# Patient Record
Sex: MALE | Race: WHITE
[De-identification: names, ages, dates, MRNs, and addresses within clinical notes are randomized per-mention and may not be internally consistent; named-entity substitution may affect disease eponyms.]

---

## 2019-01-31 ENCOUNTER — HOSPITAL ENCOUNTER (EMERGENCY)
Dept: HOSPITAL 91 - FTE | Age: 49
Discharge: HOME | End: 2019-01-31
Payer: MEDICAID

## 2019-01-31 ENCOUNTER — HOSPITAL ENCOUNTER (EMERGENCY)
Dept: HOSPITAL 10 - FTE | Age: 49
Discharge: HOME | End: 2019-01-31
Payer: MEDICAID

## 2019-01-31 VITALS — RESPIRATION RATE: 18 BRPM | SYSTOLIC BLOOD PRESSURE: 158 MMHG | HEART RATE: 91 BPM | DIASTOLIC BLOOD PRESSURE: 90 MMHG

## 2019-01-31 VITALS — HEIGHT: 60 IN | WEIGHT: 226.64 LBS | BODY MASS INDEX: 44.49 KG/M2

## 2019-01-31 DIAGNOSIS — S92.355A: Primary | ICD-10-CM
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
## 2019-01-31 PROCEDURE — 73630 X-RAY EXAM OF FOOT: CPT

## 2019-01-31 PROCEDURE — 29515 APPLICATION SHORT LEG SPLINT: CPT

## 2019-01-31 PROCEDURE — 99283 EMERGENCY DEPT VISIT LOW MDM: CPT

## 2019-01-31 PROCEDURE — 73610 X-RAY EXAM OF ANKLE: CPT

## 2019-01-31 NOTE — ERD
ER Documentation


Chief Complaint


Chief Complaint





LEFT FOOT PAIN





HPI


48-year-old male presenting with pain to left foot.  3 days ago patient was 


walking in his ankle.  He had pain to his foot ever since.  He denies any 


numbness and tingling and he has not taken medications for pain.  He denies 


other medical problems.  NKDA.  Surgical history denies.  Social history denies





ROS


All systems reviewed and are negative except as per history of present illness.





Medications


Home Meds


Active Scripts


Naproxen* (Naprosyn*) 500 Mg Tablet, 500 MG PO BID PRN for PAIN AND/OR 


INFLAMMATION, #30 TAB


   Prov:JOSE MARIA ALVARADO PA-C         19


Hydrocodone/Acetaminophen (Norco 5-325 Tablet) 1 Each Tablet, 1 TAB PO Q6H PRN 


for PAIN, #7 TAB


   Prov:JOSE MARIA ALVARADO PA-C         19





Allergies


Allergies:  


Coded Allergies:  


     No Known Allergy (Unverified , 19)





PMhx/Soc


Medical and Surgical Hx:  pt denies Medical Hx, pt denies Surgical Hx


Hx Alcohol Use:  No


Hx Substance Use:  No


Hx Tobacco Use:  No


Smoking Status:  Never smoker





FmHx


Family History:  No diabetes, No coronary disease, No other





Physical Exam


Vitals





Vital Signs


  Date      Temp  Pulse  Resp  B/P (MAP)   Pulse Ox  O2          O2 Flow    FiO2


Time                                                 Delivery    Rate


   19  98.7     91    18      158/90        99


     11:34                          (112)





Physical Exam


GENERAL: The patient is well-appearing, well-nourished, in no acute distress


CHEST: Clear to auscultation bilaterally.  There are no rales, wheezes or 


rhonchi.


HEART: Regular rate and rhythm.  No murmurs, clicks, rubs or gallops.  No S3 or 


S4.


EXTREMITIES: Tender to palpation over the base the fifth metatarsals.  Mild 


tenderness to palpation over the lateral left malleolus.  No obvious deformity. 


Normal range of motion in flexion and extension.  Strength 5 out of 5.


NEUROLOGIC:  Motor strength in all 4 extremities with 5 out of 5 strength.  


Sensation grossly intact.  


SKIN: There is no apparent rash or petechiae.  The skin is warm and dry.





Procedures/MDM


                            DIAGNOSTIC IMAGING REPORT





Patient: AINSLEY PACE   : 1970   Age: 48  Sex: M                  


     


       MR #:    [id number]   Acct #:   [id number]    DOS: 19 1221


Ordering MD: FLYNN ALVARADO PA-C   Location:  [facility]   Room/Bed:            


                               


                                        


PROCEDURE: XR Left Ankle


 


CLINICAL INDICATION:  Pain


 


TECHNIQUE:   Standard 3 view radiographs were submitted.


 


COMPARISON:   None


 


FINDINGS:


 


Osseous structures: There is a nondisplaced transverse fracture through the base


of the left fifth metatarsal. No other fractures identified. Calcaneal spurring 


is seen at the insertion of the Achilles tendon and plantar aponeurosis.


 


Joint spaces:  Well maintained with no significant erosions or spurring evident.


 


Soft tissues: There is soft tissue swelling about the lateral malleolus 


suspicious for a sprain.


 


IMPRESSION: 


1.  Nondisplaced transverse fracture involving the base of the left fifth 


metatarsal.


2.  Calcaneal spurring


3.  Soft tissue swelling about the lateral malleolus suspicious for a sprain.








                            DIAGNOSTIC IMAGING REPORT





Patient: AINSLEY PACE   : 1970   Age: 48  Sex: M                  


     


       MR #:    [id number]   Acct #:   [id number]    DOS: 19 1221


Ordering MD: FLYNN ALVARADO PA-C   Location:  [facility]   Room/Bed:            


                               


                                        


PROCEDURE: XR Left Foot


 


CLINICAL INDICATION:  Pain


 


TECHNIQUE: AP, oblique, and lateral radiographs were submitted.


 


COMPARISON:  None


 


FINDINGS:


 


Osseous structures: There is a nondisplaced transverse fracture through the base


of the left fifth metatarsal. No other fracture is evident.


 


Joint spaces: There is calcaneal spurring at the insertion of the Achilles 


tendon and plantar aponeurosis.


 


Soft tissues:  appear unremarkable.


 


IMPRESSION: 


1.  Nondisplaced transverse fracture involving the base of the left fifth 


metatarsal.


2.  Calcaneal spurring.





ER course:Short ankle splint applied in ED.  Patient is given crutches.





MDM: 48-year-old male presenting with pain to left foot.  Patient has a fracture


noted to the base of the fifth metatarsal.  Patient likely has a sprain to 


ankle.  Patient is placed in splint in the emergency room.  Patient is neuro 


intact pre-and post splint application.  Patient is recommended follow-up with 


orthopedist.  Patient is told symptoms change or worsen to return immediately to


the ER.  All questions answered at discharge





Departure


Diagnosis:  


   Primary Impression:  


   Dancer's fracture


Condition:  Stable


Patient Instructions:  Fracture, Foot


Referrals:  


BEBE RUANO MD








[facility]


Hours: Mon-Fri


9:00 AM - 5:00 PM





Additional Instructions:  


FOLLOW UP WITH YOUR PRIMARY CARE PHYSICIAN TOMORROW.Return to this facility if y


ou are not improving as expected.











JOSE MARIA ALVARADO PA-C       2019 14:04